# Patient Record
Sex: FEMALE | Race: ASIAN | NOT HISPANIC OR LATINO | Employment: FULL TIME | URBAN - METROPOLITAN AREA
[De-identification: names, ages, dates, MRNs, and addresses within clinical notes are randomized per-mention and may not be internally consistent; named-entity substitution may affect disease eponyms.]

---

## 2023-08-10 ENCOUNTER — TELEPHONE (OUTPATIENT)
Dept: PRIMARY CARE | Facility: CLINIC | Age: 48
End: 2023-08-10
Payer: COMMERCIAL

## 2023-08-10 DIAGNOSIS — Z12.31 ENCOUNTER FOR SCREENING MAMMOGRAM FOR MALIGNANT NEOPLASM OF BREAST: Primary | ICD-10-CM

## 2023-08-10 NOTE — TELEPHONE ENCOUNTER
Pt needs a mammogram for pt work insurance incentive before 9/30/23  and is asking for an order to be placed in her chart. Pt will be calling back to schedule her annual in October when knows her work schedule.

## 2023-11-06 ENCOUNTER — OFFICE VISIT (OUTPATIENT)
Dept: PRIMARY CARE | Facility: CLINIC | Age: 48
End: 2023-11-06
Payer: COMMERCIAL

## 2023-11-06 VITALS
WEIGHT: 128.6 LBS | SYSTOLIC BLOOD PRESSURE: 98 MMHG | OXYGEN SATURATION: 98 % | BODY MASS INDEX: 25.25 KG/M2 | DIASTOLIC BLOOD PRESSURE: 64 MMHG | HEART RATE: 60 BPM | HEIGHT: 60 IN

## 2023-11-06 DIAGNOSIS — G89.29 CHRONIC PAIN OF BOTH KNEES: ICD-10-CM

## 2023-11-06 DIAGNOSIS — Z12.83 SKIN EXAM, SCREENING FOR CANCER: ICD-10-CM

## 2023-11-06 DIAGNOSIS — Z12.31 ENCOUNTER FOR SCREENING MAMMOGRAM FOR MALIGNANT NEOPLASM OF BREAST: ICD-10-CM

## 2023-11-06 DIAGNOSIS — M25.562 CHRONIC PAIN OF BOTH KNEES: ICD-10-CM

## 2023-11-06 DIAGNOSIS — Z13.220 SCREENING FOR HYPERLIPIDEMIA: ICD-10-CM

## 2023-11-06 DIAGNOSIS — Z00.00 WELL ADULT EXAM: Primary | ICD-10-CM

## 2023-11-06 DIAGNOSIS — M25.561 CHRONIC PAIN OF BOTH KNEES: ICD-10-CM

## 2023-11-06 DIAGNOSIS — Z13.1 SCREENING FOR DIABETES MELLITUS: ICD-10-CM

## 2023-11-06 PROCEDURE — 1036F TOBACCO NON-USER: CPT | Performed by: FAMILY MEDICINE

## 2023-11-06 PROCEDURE — 99396 PREV VISIT EST AGE 40-64: CPT | Performed by: FAMILY MEDICINE

## 2023-11-06 ASSESSMENT — PATIENT HEALTH QUESTIONNAIRE - PHQ9
2. FEELING DOWN, DEPRESSED OR HOPELESS: NOT AT ALL
1. LITTLE INTEREST OR PLEASURE IN DOING THINGS: NOT AT ALL
SUM OF ALL RESPONSES TO PHQ9 QUESTIONS 1 AND 2: 0

## 2023-11-06 NOTE — PROGRESS NOTES
Subjective   Patient ID: Delfina Jacobs is a 48 y.o. female who presents for Annual Exam (Patient is here for annual physical. She has a pap in 2021 and results were normal. She would also like a derm referral. ).      Past Medical, Surgical, Social and Family Hx reviewed-Yes    Any acute complaints?    She exercises 3-4 days a week and she has started to notice bilat knee pain.    Any chronic issues addressed today or Rx refills needed?    No    Last pap 2021  Last Mammogram 2023  Colon CA screening Hx 2022  Labs   Up to date on immunizations yes  Dentist in the past year yes    Menstruation No concerns, getting a little irregular  Sexual Activity no concerns        PE:  BP 98/64   Pulse 60   Ht 1.524 m (5')   Wt 58.3 kg (128 lb 9.6 oz)   LMP 10/11/2023   SpO2 98%   BMI 25.12 kg/m²   Alert adult woman, NAD  HEENT clear  Neck supple, No LAD  Heart RRR no murmur  Lungs CTA bilat  Abdomen benign, normal BS, soft NT/ND  Skin warm, dry, clear  Neuro grossly normal, gait WNL  Affect pleasant and appropriate, memory and judgement WNL        Assessment/Plan   Problem List Items Addressed This Visit    None  Visit Diagnoses         Codes    Well adult exam    -  Primary Z00.00    Encounter for screening mammogram for malignant neoplasm of breast     Z12.31    Relevant Orders    BI mammo bilateral screening tomosynthesis    Screening for diabetes mellitus     Z13.1    Screening for hyperlipidemia     Z13.220    Skin exam, screening for cancer     Z12.83    Relevant Orders    Referral to Dermatology    Chronic pain of both knees     M25.561, M25.562, G89.29          She already had screening labs done this year at work.      I am not sure about the cause of the mild intermittent knee pain.  I offered Ortho referral but she wants to wait and consider.

## 2024-01-11 ENCOUNTER — ANCILLARY PROCEDURE (OUTPATIENT)
Dept: RADIOLOGY | Facility: CLINIC | Age: 49
End: 2024-01-11
Payer: COMMERCIAL

## 2024-01-11 ENCOUNTER — OFFICE VISIT (OUTPATIENT)
Dept: ORTHOPEDIC SURGERY | Facility: CLINIC | Age: 49
End: 2024-01-11
Payer: COMMERCIAL

## 2024-01-11 DIAGNOSIS — M25.562 LEFT KNEE PAIN, UNSPECIFIED CHRONICITY: ICD-10-CM

## 2024-01-11 DIAGNOSIS — M22.42 CHONDROMALACIA OF LEFT PATELLA: Primary | ICD-10-CM

## 2024-01-11 PROCEDURE — 73564 X-RAY EXAM KNEE 4 OR MORE: CPT | Mod: LEFT SIDE | Performed by: RADIOLOGY

## 2024-01-11 PROCEDURE — 1036F TOBACCO NON-USER: CPT | Performed by: FAMILY MEDICINE

## 2024-01-11 PROCEDURE — 99203 OFFICE O/P NEW LOW 30 MIN: CPT | Performed by: FAMILY MEDICINE

## 2024-01-11 PROCEDURE — 99213 OFFICE O/P EST LOW 20 MIN: CPT | Performed by: FAMILY MEDICINE

## 2024-01-11 PROCEDURE — 73564 X-RAY EXAM KNEE 4 OR MORE: CPT | Mod: LT

## 2024-01-11 NOTE — PROGRESS NOTES
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    She complains of over 6 months of diffuse, dull, achy, nearly constant anterior left knee pain that is exacerbated with exercise activities.  She does weights and cardio daily.  She does strength training Monday, Wednesday and Friday for the past year and step aerobics Tuesday and Thursday for the past 1 to 2 years.  Prior to that she was a runner.  She began having pain in the summer.  She did go through a few episodes of no pain but it would return.  She denies recent or remote injury or trauma.  She has had no previous treatments.  She paused her exercise routine briefly over Christmas for 3 weeks and she had lessened pain that eventually stopped.  As she resumed her activity her pain returned.  She has no problems with the right knee.  She denies mechanical complaints or swelling.  She recently started taking glucosamine/chondroitin.    She has no other complaints.    Physical Examination:     The LEFT knee is without obvious signs of acute bony deformity, swelling, erythema, ecchymosis or joint effusion. The patella is without tenderness. Apprehension is negative with medial and lateral glide. Patella crepitus is POSITIVE. Patella grind is POSITIVE. The medial joint line is nontender and without bony crepitus or step-off. The lateral joint line is nontender and without bony crepitus or step-off. Flexion & extension are full and symmetrical. Varus & valgus stress test at 0° and 30° of flexion, Lachman's, Ramin's, and posterior drawer are all negative. The opposite knee is nontender and stable. Gait is pain-free and tandem.    Imaging:  Radiographs of the left knee obtained today were reviewed and revealed a mildly shallow trochlear groove and narrowing of the lateral patella facet articulation.  The weightbearing compartments appear normal.   There are no signs of acute fractures or dislocations.  The studies were reviewed by me personally in the office today.    Problem List Items Addressed This Visit             ICD-10-CM    Chondromalacia of left patella - Primary M22.42    Relevant Orders    Referral to Physical Therapy     Other Visit Diagnoses         Codes    Left knee pain, unspecified chronicity     M25.562    Relevant Orders    XR knee left 4+ views            Assessment and Plan:     We reviewed the exam and x-ray findings and discussed the conservative and surgical treatment options. We agreed to treat her chondromalacia conservatively at this time.  She will continue on glucosamine and chondroitin daily.  I offered a patella stabilizing brace and she declined.  We will defer this to follow-up if needed.  She was referred to formal physical therapy for strengthening of her core, knee and leg.  Activity modifications were reviewed.  She can take over-the-counter NSAIDs for pain control.  She will follow-up in 2 to 3 months if her symptoms persist or worsen.    **This note was dictated using Dragon speech recognition software and was not corrected for spelling or grammatical errors**.    Isreal Mckenzie MD  Sports Medicine Specialist  [facility] [place]

## 2024-01-11 NOTE — LETTER
January 11, 2024     Christina Rogers MD  [address]  [facility] [address]  [place] [address]    Patient: Delfina Jacobs   YOB: 1975   Date of Visit: 1/11/2024       Dear Dr. Christina Rogers MD:    Thank you for referring Delfina Jacobs to me for evaluation. Below are my notes for this consultation.  If you have questions, please do not hesitate to call me. I look forward to following your patient along with you.       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    She complains of over 6 months of diffuse, dull, achy, nearly constant anterior left knee pain that is exacerbated with exercise activities.  She does weights and cardio daily.  She does strength training Monday, Wednesday and Friday for the past year and step aerobics Tuesday and Thursday for the past 1 to 2 years.  Prior to that she was a runner.  She began having pain in the summer.  She did go through a few episodes of no pain but it would return.  She denies recent or remote injury or trauma.  She has had no previous treatments.  She paused her exercise routine briefly over Christmas for 3 weeks and she had lessened pain that eventually stopped.  As she resumed her activity her pain returned.  She has no problems with the right knee.  She denies mechanical complaints or swelling.  She recently started taking glucosamine/chondroitin.    She has no other complaints.    Physical Examination:     The LEFT knee is without obvious signs of acute bony deformity, swelling, erythema, ecchymosis or joint effusion. The patella is without tenderness. Apprehension is negative with medial and lateral glide. Patella crepitus is POSITIVE. Patella grind is POSITIVE. The medial joint line is nontender and  without bony crepitus or step-off. The lateral joint line is nontender and without bony crepitus or step-off. Flexion & extension are full and symmetrical. Varus & valgus stress test at 0° and 30° of flexion, Lachman's, Ramin's, and posterior drawer are all negative. The opposite knee is nontender and stable. Gait is pain-free and tandem.    Imaging:  Radiographs of the left knee obtained today were reviewed and revealed a mildly shallow trochlear groove and narrowing of the lateral patella facet articulation.  The weightbearing compartments appear normal.  There are no signs of acute fractures or dislocations.  The studies were reviewed by me personally in the office today.    Problem List Items Addressed This Visit             ICD-10-CM    Chondromalacia of left patella - Primary M22.42    Relevant Orders    Referral to Physical Therapy     Other Visit Diagnoses         Codes    Left knee pain, unspecified chronicity     M25.562    Relevant Orders    XR knee left 4+ views            Assessment and Plan:     We reviewed the exam and x-ray findings and discussed the conservative and surgical treatment options. We agreed to treat her chondromalacia conservatively at this time.  She will continue on glucosamine and chondroitin daily.  I offered a patella stabilizing brace and she declined.  We will defer this to follow-up if needed.  She was referred to formal physical therapy for strengthening of her core, knee and leg.  Activity modifications were reviewed.  She can take over-the-counter NSAIDs for pain control.  She will follow-up in 2 to 3 months if her symptoms persist or worsen.    **This note was dictated using Dragon speech recognition software and was not corrected for spelling or grammatical errors**.    Isreal Mckenzie MD  Sports Medicine Specialist  [facility] [place]

## 2024-11-06 ENCOUNTER — APPOINTMENT (OUTPATIENT)
Dept: PRIMARY CARE | Facility: CLINIC | Age: 49
End: 2024-11-06
Payer: COMMERCIAL

## 2024-11-06 VITALS
SYSTOLIC BLOOD PRESSURE: 94 MMHG | HEART RATE: 72 BPM | DIASTOLIC BLOOD PRESSURE: 67 MMHG | OXYGEN SATURATION: 96 % | WEIGHT: 130 LBS | HEIGHT: 60 IN | BODY MASS INDEX: 25.52 KG/M2

## 2024-11-06 DIAGNOSIS — Z12.31 ENCOUNTER FOR SCREENING MAMMOGRAM FOR MALIGNANT NEOPLASM OF BREAST: ICD-10-CM

## 2024-11-06 DIAGNOSIS — Z00.00 WELL ADULT EXAM: Primary | ICD-10-CM

## 2024-11-06 PROCEDURE — 90471 IMMUNIZATION ADMIN: CPT | Performed by: FAMILY MEDICINE

## 2024-11-06 PROCEDURE — 99396 PREV VISIT EST AGE 40-64: CPT | Performed by: FAMILY MEDICINE

## 2024-11-06 PROCEDURE — 90656 IIV3 VACC NO PRSV 0.5 ML IM: CPT | Performed by: FAMILY MEDICINE

## 2024-11-06 PROCEDURE — 3008F BODY MASS INDEX DOCD: CPT | Performed by: FAMILY MEDICINE

## 2024-11-06 ASSESSMENT — PATIENT HEALTH QUESTIONNAIRE - PHQ9
1. LITTLE INTEREST OR PLEASURE IN DOING THINGS: NOT AT ALL
SUM OF ALL RESPONSES TO PHQ9 QUESTIONS 1 AND 2: 0
2. FEELING DOWN, DEPRESSED OR HOPELESS: NOT AT ALL

## 2024-11-06 NOTE — PROGRESS NOTES
Subjective   Patient ID: Delfina Jacobs is a 49 y.o. female who presents for Annual Exam (Patient is here for annual physical. ).      Past Medical, Surgical, Social and Family Hx reviewed-Yes    Any acute complaints?    She wonders if she is having some perimenopasue    Any chronic issues addressed today or Rx refills needed?    none    Last pap 2021  Last Mammogram 2023  Colon CA screening Hx 2023  Labs reviewed 2022, she also had normal labs at work this year  Up to date on immunizations yes, needs flu and covid  Dentist in the past year     Menstruation some changes, nothing horrible  Sexual Activity not active      PE:  BP 94/67   Pulse 72   Ht 1.524 m (5')   Wt 59 kg (130 lb)   LMP 10/09/2024   SpO2 96%   BMI 25.39 kg/m²   Alert adult woman, NAD  HEENT clear  Neck supple, No LAD  Heart RRR no murmur  Lungs CTA bilat  Abdomen benign, normal BS, soft NT/ND  Skin warm, dry, clear  Neuro grossly normal, gait WNL  Affect pleasant and appropriate, memory and judgement WNL        Assessment/Plan   Problem List Items Addressed This Visit    None  Visit Diagnoses         Codes    Well adult exam    -  Primary Z00.00    Encounter for screening mammogram for malignant neoplasm of breast     Z12.31    Relevant Orders    BI mammo bilateral screening tomosynthesis          Reviewed HM and vaccines

## 2025-03-06 ENCOUNTER — HOSPITAL ENCOUNTER (OUTPATIENT)
Dept: RADIOLOGY | Facility: CLINIC | Age: 50
Discharge: HOME | End: 2025-03-06
Payer: COMMERCIAL

## 2025-03-06 VITALS — BODY MASS INDEX: 25.91 KG/M2 | HEIGHT: 60 IN | WEIGHT: 132 LBS

## 2025-03-06 DIAGNOSIS — Z12.31 ENCOUNTER FOR SCREENING MAMMOGRAM FOR MALIGNANT NEOPLASM OF BREAST: ICD-10-CM

## 2025-03-06 PROCEDURE — 77067 SCR MAMMO BI INCL CAD: CPT

## 2025-03-06 PROCEDURE — 77063 BREAST TOMOSYNTHESIS BI: CPT | Performed by: RADIOLOGY

## 2025-03-06 PROCEDURE — 77067 SCR MAMMO BI INCL CAD: CPT | Performed by: RADIOLOGY

## 2025-11-07 ENCOUNTER — APPOINTMENT (OUTPATIENT)
Dept: PRIMARY CARE | Facility: CLINIC | Age: 50
End: 2025-11-07
Payer: COMMERCIAL